# Patient Record
Sex: FEMALE
[De-identification: names, ages, dates, MRNs, and addresses within clinical notes are randomized per-mention and may not be internally consistent; named-entity substitution may affect disease eponyms.]

---

## 2021-08-25 ENCOUNTER — HOSPITAL ENCOUNTER (OUTPATIENT)
Dept: HOSPITAL 5 - SPVWC | Age: 66
Discharge: HOME | End: 2021-08-25
Attending: SURGERY
Payer: COMMERCIAL

## 2021-08-25 DIAGNOSIS — Z12.31: Primary | ICD-10-CM

## 2021-08-25 PROCEDURE — 77067 SCR MAMMO BI INCL CAD: CPT

## 2021-08-25 PROCEDURE — 77063 BREAST TOMOSYNTHESIS BI: CPT

## 2021-08-26 NOTE — MAMMOGRAPHY REPORT
DIGITAL SCREENING MAMMOGRAM WITH CAD, 8/25/2021



CLINICAL INFORMATION / INDICATION: Routine screening mammography. SCREENING MAMMO WITH JEANNETTE



TECHNIQUE:  Digital bilateral 2D mammography was obtained in the craniocaudal and mediolateral obliqu
e projections. This examination was interpreted with the benefit of Computer-Aided Detection analysis
.



COMPARISON: 8/25/2020



FINDINGS: 



Breast Density: The breasts are heterogeneously dense, which may obscure small masses.



No dominant mass, suspicious calcifications, or architectural distortion in either breast. 



Largely unchanged nodular densities in the breasts, commonly fibroglandular or fibrocystic change.



 

IMPRESSION: No mammographic evidence of malignancy.



Follow up recommendation: Routine yearly



BI-RADS Category 2:  Benign.





-------------------------------------------------------------------------------------------

A "normal" or negative report should not discourage follow up or biopsy of a clinically significant f
inding.



A written summary of these findings will be mailed to the patient. The patient will be entered into a
 mammography reporting system which will generate a reminder letter for the patient's next appointmen
t at the appropriate interval.



The [employer] recommends yearly mammograms starting at age 40 and continuing as l
sallie as a woman is in good health.  Breast MRI is recommended for women with an approximate 20-25% or 
greater lifetime risk of breast cancer, including women with a strong family history of breast or ova
ricky cancer or who have been treated for Hodgkin's disease.



Signer Name: Rusty Babcock MD 

Signed: 8/26/2021 10:04 AM

Workstation Name: [id number]

## 2021-08-26 NOTE — MAMMOGRAPHY REPORT
DIGITAL SCREENING MAMMOGRAM WITH CAD, 8/25/2021



CLINICAL INFORMATION / INDICATION: Routine screening mammography. SCREENING MAMMO WITH JEANNETTE



TECHNIQUE:  Digital bilateral 2D mammography was obtained in the craniocaudal and mediolateral obliqu
e projections. This examination was interpreted with the benefit of Computer-Aided Detection analysis
.



COMPARISON: 8/25/2020



FINDINGS: 



Breast Density: The breasts are heterogeneously dense, which may obscure small masses.



No dominant mass, suspicious calcifications, or architectural distortion in either breast. 



Largely unchanged nodular densities in the breasts, commonly fibroglandular or fibrocystic change.



 

IMPRESSION: No mammographic evidence of malignancy.



Follow up recommendation: Routine yearly



BI-RADS Category 2:  Benign.





-------------------------------------------------------------------------------------------

A "normal" or negative report should not discourage follow up or biopsy of a clinically significant f
inding.



A written summary of these findings will be mailed to the patient. The patient will be entered into a
 mammography reporting system which will generate a reminder letter for the patient's next appointmen
t at the appropriate interval.



The [employer] recommends yearly mammograms starting at age 40 and continuing as l
sallie as a woman is in good health.  Breast MRI is recommended for women with an approximate 20-25% or 
greater lifetime risk of breast cancer, including women with a strong family history of breast or ova
ricky cancer or who have been treated for Hodgkin's disease.



Signer Name: Rusty Babcock MD 

Signed: 8/26/2021 10:04 AM

Workstation Name: [id number]

## 2022-09-13 ENCOUNTER — HOSPITAL ENCOUNTER (OUTPATIENT)
Dept: HOSPITAL 5 - SPVWC | Age: 67
Discharge: HOME | End: 2022-09-13
Attending: SURGERY
Payer: MEDICARE

## 2022-09-13 DIAGNOSIS — Z12.31: Primary | ICD-10-CM

## 2022-09-13 PROCEDURE — 77067 SCR MAMMO BI INCL CAD: CPT

## 2022-09-13 PROCEDURE — 77063 BREAST TOMOSYNTHESIS BI: CPT

## 2022-09-14 NOTE — MAMMOGRAPHY REPORT
DIGITAL SCREENING MAMMOGRAM WITH TOMOSYNTHESIS WITH CAD, 9/13/2022



CLINICAL INFORMATION / INDICATION: Routine Screening Mammography. SCREENING MAMMO WITH JEANNETTE Z12.31.



TECHNIQUE: Digital bilateral 2D and 3D mammography with tomosynthesis was obtained in the craniocauda
l and mediolateral oblique projections.  Computer-Aided Detection (CAD) analysis was used for interpr
etation of this study.



COMPARISON: 1/4/2018 through 8/25/2021.



FINDINGS: 



Breast Density: The breasts are heterogeneously dense, which may obscure small masses.



No dominant mass, suspicious calcifications, or architectural distortion in either breast. 



There are bilateral reduction changes. Benign-appearing nodularity on the left is again identified.

 

IMPRESSION: No mammographic evidence of malignancy.



Follow up recommendation: Routine yearly screening mammogram.



BI-RADS Category 2:  BENIGN.





-------------------------------------------------------------------------------------------

A "normal" or negative report should not discourage follow up or biopsy of a clinically significant f
inding.



A written summary of these findings will be mailed to the patient. The patient will be entered into a
 mammography reporting system which will generate a reminder letter for the patient's next appointmen
t at the appropriate interval.



The [employer] recommends yearly mammograms starting at age 40 and continuing as l
sallie as a woman is in good health.  Breast MRI is recommended for women with an approximate 20-25% or 
greater lifetime risk of breast cancer, including women with a strong family history of breast or ova
ricky cancer or who have been treated for Hodgkin's disease.



Signer Name: Garret Romero MD 

Signed: 9/14/2022 8:24 AM

Workstation Name: [email or web address]

## 2022-09-14 NOTE — MAMMOGRAPHY REPORT
DIGITAL SCREENING MAMMOGRAM WITH TOMOSYNTHESIS WITH CAD, 9/13/2022



CLINICAL INFORMATION / INDICATION: Routine Screening Mammography. SCREENING MAMMO WITH JEANNETTE Z12.31.



TECHNIQUE: Digital bilateral 2D and 3D mammography with tomosynthesis was obtained in the craniocauda
l and mediolateral oblique projections.  Computer-Aided Detection (CAD) analysis was used for interpr
etation of this study.



COMPARISON: 1/4/2018 through 8/25/2021.



FINDINGS: 



Breast Density: The breasts are heterogeneously dense, which may obscure small masses.



No dominant mass, suspicious calcifications, or architectural distortion in either breast. 



There are bilateral reduction changes. Benign-appearing nodularity on the left is again identified.

 

IMPRESSION: No mammographic evidence of malignancy.



Follow up recommendation: Routine yearly screening mammogram.



BI-RADS Category 2:  BENIGN.





-------------------------------------------------------------------------------------------

A "normal" or negative report should not discourage follow up or biopsy of a clinically significant f
inding.



A written summary of these findings will be mailed to the patient. The patient will be entered into a
 mammography reporting system which will generate a reminder letter for the patient's next appointmen
t at the appropriate interval.



The [employer] recommends yearly mammograms starting at age 40 and continuing as l
sallie as a woman is in good health.  Breast MRI is recommended for women with an approximate 20-25% or 
greater lifetime risk of breast cancer, including women with a strong family history of breast or ova
ricky cancer or who have been treated for Hodgkin's disease.



Signer Name: Garret Romero MD 

Signed: 9/14/2022 8:24 AM

Workstation Name: [email or web address]